# Patient Record
Sex: FEMALE | Race: BLACK OR AFRICAN AMERICAN | NOT HISPANIC OR LATINO | URBAN - METROPOLITAN AREA
[De-identification: names, ages, dates, MRNs, and addresses within clinical notes are randomized per-mention and may not be internally consistent; named-entity substitution may affect disease eponyms.]

---

## 2022-04-25 ENCOUNTER — HOSPITAL ENCOUNTER (EMERGENCY)
Facility: OTHER | Age: 32
Discharge: HOME | End: 2022-04-25
Attending: EMERGENCY MEDICINE
Payer: COMMERCIAL

## 2022-04-25 VITALS
DIASTOLIC BLOOD PRESSURE: 92 MMHG | SYSTOLIC BLOOD PRESSURE: 147 MMHG | HEIGHT: 67 IN | RESPIRATION RATE: 16 BRPM | TEMPERATURE: 98 F | WEIGHT: 210 LBS | OXYGEN SATURATION: 100 % | BODY MASS INDEX: 32.96 KG/M2 | HEART RATE: 79 BPM

## 2022-04-25 DIAGNOSIS — H57.89 EYE IRRITATION: ICD-10-CM

## 2022-04-25 DIAGNOSIS — Z77.098 EXPOSURE TO CHEMICAL IRRITANT: Primary | ICD-10-CM

## 2022-04-25 PROCEDURE — 99282 EMERGENCY DEPT VISIT SF MDM: CPT

## 2022-04-25 NOTE — Clinical Note
"Irina Carreonkaley" Can was seen and treated in our emergency department on 4/25/2022.  She may return to work on 04/27/2022.       If you have any questions or concerns, please don't hesitate to call.      KARLOS Hatfield"
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
